# Patient Record
Sex: MALE | Race: WHITE | NOT HISPANIC OR LATINO | Employment: OTHER | URBAN - NONMETROPOLITAN AREA
[De-identification: names, ages, dates, MRNs, and addresses within clinical notes are randomized per-mention and may not be internally consistent; named-entity substitution may affect disease eponyms.]

---

## 2020-11-29 ENCOUNTER — HOSPITAL ENCOUNTER (EMERGENCY)
Facility: HOSPITAL | Age: 75
Discharge: HOME | End: 2020-11-29
Attending: FAMILY MEDICINE | Admitting: FAMILY MEDICINE

## 2020-11-29 ENCOUNTER — APPOINTMENT (OUTPATIENT)
Dept: GENERAL RADIOLOGY | Facility: HOSPITAL | Age: 75
End: 2020-11-29

## 2020-11-29 VITALS
HEART RATE: 66 BPM | SYSTOLIC BLOOD PRESSURE: 135 MMHG | RESPIRATION RATE: 20 BRPM | HEIGHT: 69 IN | OXYGEN SATURATION: 98 % | DIASTOLIC BLOOD PRESSURE: 76 MMHG | WEIGHT: 240 LBS | TEMPERATURE: 97.9 F | BODY MASS INDEX: 35.55 KG/M2

## 2020-11-29 DIAGNOSIS — S43.005A DISLOCATION OF LEFT SHOULDER JOINT, INITIAL ENCOUNTER: Primary | ICD-10-CM

## 2020-11-29 PROCEDURE — 99284 EMERGENCY DEPT VISIT MOD MDM: CPT

## 2020-11-29 PROCEDURE — 73030 X-RAY EXAM OF SHOULDER: CPT

## 2020-11-29 PROCEDURE — 73020 X-RAY EXAM OF SHOULDER: CPT

## 2020-11-29 RX ORDER — AMLODIPINE BESYLATE 5 MG/1
5 TABLET ORAL DAILY
COMMUNITY
Start: 2020-11-02 | End: 2021-05-02

## 2020-11-29 RX ORDER — ONDANSETRON 4 MG/1
4 TABLET, ORALLY DISINTEGRATING ORAL EVERY 6 HOURS PRN
Qty: 10 TABLET | Refills: 0 | Status: SHIPPED | OUTPATIENT
Start: 2020-11-29

## 2020-11-29 RX ORDER — TAMSULOSIN HYDROCHLORIDE 0.4 MG/1
1 CAPSULE ORAL DAILY
COMMUNITY

## 2020-11-29 RX ORDER — KETAMINE HYDROCHLORIDE 100 MG/ML
1 INJECTION INTRAMUSCULAR; INTRAVENOUS ONCE
Status: COMPLETED | OUTPATIENT
Start: 2020-11-29 | End: 2020-11-29

## 2020-11-29 RX ORDER — NITROGLYCERIN 0.4 MG/1
0.4 TABLET SUBLINGUAL
COMMUNITY

## 2020-11-29 RX ORDER — MULTIPLE VITAMINS W/ MINERALS TAB 9MG-400MCG
1 TAB ORAL DAILY
COMMUNITY

## 2020-11-29 RX ORDER — HYDROCODONE BITARTRATE AND ACETAMINOPHEN 7.5; 325 MG/1; MG/1
1 TABLET ORAL EVERY 6 HOURS PRN
Qty: 12 TABLET | Refills: 0 | Status: SHIPPED | OUTPATIENT
Start: 2020-11-29 | End: 2020-12-01

## 2020-11-29 RX ORDER — ACETAMINOPHEN AND CODEINE PHOSPHATE 300; 30 MG/1; MG/1
1 TABLET ORAL EVERY 4 HOURS PRN
COMMUNITY

## 2020-11-29 RX ORDER — ASPIRIN 81 MG/1
81 TABLET, CHEWABLE ORAL DAILY
COMMUNITY

## 2020-11-29 RX ADMIN — KETAMINE HYDROCHLORIDE 109 MG: 100 INJECTION INTRAMUSCULAR; INTRAVENOUS at 14:46

## 2020-12-01 ENCOUNTER — OFFICE VISIT (OUTPATIENT)
Dept: ORTHOPEDIC SURGERY | Facility: CLINIC | Age: 75
End: 2020-12-01

## 2020-12-01 VITALS
HEIGHT: 69 IN | BODY MASS INDEX: 34.18 KG/M2 | OXYGEN SATURATION: 98 % | WEIGHT: 230.8 LBS | DIASTOLIC BLOOD PRESSURE: 79 MMHG | SYSTOLIC BLOOD PRESSURE: 150 MMHG | TEMPERATURE: 98 F | HEART RATE: 84 BPM

## 2020-12-01 DIAGNOSIS — S43.004S SHOULDER DISLOCATION, RIGHT, SEQUELA: ICD-10-CM

## 2020-12-01 DIAGNOSIS — M25.512 ACUTE PAIN OF LEFT SHOULDER: Primary | ICD-10-CM

## 2020-12-01 PROCEDURE — 99202 OFFICE O/P NEW SF 15 MIN: CPT | Performed by: ORTHOPAEDIC SURGERY

## 2020-12-01 RX ORDER — HYDROCODONE BITARTRATE AND ACETAMINOPHEN 7.5; 325 MG/1; MG/1
TABLET ORAL
Qty: 20 TABLET | Refills: 0 | Status: SHIPPED | OUTPATIENT
Start: 2020-12-01 | End: 2020-12-15

## 2020-12-01 NOTE — PROGRESS NOTES
Akash Crum is a 75 y.o. male   Primary provider:  Katlyn Adhikari DO       Chief Complaint   Patient presents with   • Left Shoulder - Pain   • Establish Care       HISTORY OF PRESENT ILLNESS: Patient being seen for left shoulder pain due to injury occurring 11/29/2020. X-rays done at .     This is the first office visit for evaluation of left shoulder pain.    Mr. Crum is 75 years old and right-hand dominant.  He was loading a trailer on 29 November when he fell with an injury to the left shoulder.  The exact mechanism of injury is uncertain.  He was seen in the emergency room and x-rays showed a dislocation of the shoulder.  He underwent closed manipulation.  He is having the expected amount of pain.  Denies any numbness or tingling in the limb.  Past history is remarkable for previous problems with the left shoulder requiring injection in the remote past.  It sounds like he likely had rotator cuff tendinitis.  He says those symptoms significantly improved after he retired.    Home medications include Tylenol No. 3 Norvasc aspirin Metformin Zofran and hydrocodone.  He reportedly takes the Tylenol 3 on a chronic basis and was prescribed hydrocodone in the emergency room.  He is allergic to latex niacin and statins.  Past medical history is remarkable for diabetes and coronary artery disease.  He is retired in the coal mines and .      Pain  This is a new problem. The current episode started in the past 7 days. Associated symptoms comments: Aching, bruising, swelling. He has tried ice for the symptoms.        CONCURRENT MEDICAL HISTORY:    Past Medical History:   Diagnosis Date   • Cardiac disease    • Diabetes (CMS/HCC)        Allergies   Allergen Reactions   • Statins Myalgia   • Colesevelam Myalgia     Diarrhea Welchol 6 tabs   • Evolocumab Myalgia     Muscle cramps   • Ezetimibe Myalgia     Muscle Aches   • Latex Rash     Unsure if truly allergic. Had skin peeling with bandaids. Did not occur  with Latex-free bandaids.     • Niacin Rash     Excessive flushing         Current Outpatient Medications:   •  acetaminophen-codeine (TYLENOL #3) 300-30 MG per tablet, Take 1 tablet by mouth Every 4 (Four) Hours As Needed for Moderate Pain ., Disp: , Rfl:   •  amLODIPine (NORVASC) 5 MG tablet, Take 5 mg by mouth Daily., Disp: , Rfl:   •  aspirin 81 MG chewable tablet, Chew 81 mg Daily., Disp: , Rfl:   •  Cholecalciferol (vitamin D3) 125 MCG (5000 UT) capsule capsule, Take 5,000 Units by mouth Daily., Disp: , Rfl:   •  metFORMIN (GLUCOPHAGE) 500 MG tablet, Take 500 mg by mouth., Disp: , Rfl:   •  multivitamin with minerals (CENTRUM ADULTS PO), Take 1 tablet by mouth Daily., Disp: , Rfl:   •  nitroglycerin (NITROSTAT) 0.4 MG SL tablet, Place 0.4 mg under the tongue., Disp: , Rfl:   •  ondansetron ODT (ZOFRAN-ODT) 4 MG disintegrating tablet, Place 1 tablet on the tongue Every 6 (Six) Hours As Needed for Nausea or Vomiting., Disp: 10 tablet, Rfl: 0  •  tamsulosin (FLOMAX) 0.4 MG capsule 24 hr capsule, Take 1 capsule by mouth Daily., Disp: , Rfl:   •  HYDROcodone-acetaminophen (NORCO) 7.5-325 MG per tablet, 1 by mouth 3 times a day PRN severe pain, Disp: 20 tablet, Rfl: 0    Past Surgical History:   Procedure Laterality Date   • BACK SURGERY      lumbar   • HIP SURGERY  2016   • OTHER SURGICAL HISTORY      2 cardiac stents       Family History   Problem Relation Age of Onset   • Heart disease Other    • Hypertension Other    • Diabetes Other         Social History     Socioeconomic History   • Marital status:      Spouse name: Not on file   • Number of children: Not on file   • Years of education: Not on file   • Highest education level: Not on file   Tobacco Use   • Smoking status: Never Smoker   • Smokeless tobacco: Never Used   Substance and Sexual Activity   • Alcohol use: Never     Frequency: Never   • Drug use: Never      ReView of systems is positive as noted above.  Review of Systems   Constitutional:  "Negative.    HENT: Negative.    Eyes: Negative.    Respiratory: Negative.    Cardiovascular: Negative.    Gastrointestinal: Negative.    Endocrine: Negative.    Genitourinary: Negative.    Musculoskeletal: Negative.    Skin: Negative.    Allergic/Immunologic: Negative.    Neurological: Negative.    Hematological: Negative.    Psychiatric/Behavioral: Negative.    Review of systems is positive as noted above.    PHYSICAL EXAMINATION:       Vitals:    12/01/20 1356   BP: 150/79   BP Location: Right arm   Patient Position: Sitting   Pulse: 84   Temp: 98 °F (36.7 °C)   SpO2: 98%   Weight: 105 kg (230 lb 12.8 oz)   Height: 175.3 cm (69\")   PainSc:   4       Physical Exam he is alert pleasant and in apparent mild discomfort at rest.  He responds appropriately to questions and commands.  GAIT:     []  Normal  [x]  Antalgic    Assistive device: [x]  None  []  Walker     []  Crutches  []  Cane     []  Wheelchair  []  Stretcher    Ortho Exam is directed to the left upper extremity.  There was moderate ecchymosis over the lateral aspect of the upper pectoral region and anterior deltoid.  There is no palpable hematoma.  Gentle rotation of the shoulder produces no crepitus and mild pain.  Sensory exam is intact to soft touch.  Radial ulnar median and musculocutaneous motor function are all intact.  It was difficult to assess strength about the shoulder because of the moderate pain.  There was moderate weakness of external rotation.    Radiographs of the shoulder dated 29th November reviewed.  There is an anterior dislocation of the shoulder in a subcoracoid location.  There was moderate degenerative change of the acromioclavicular joint as well as a moderate spur of the anterior acromion.  Radiographs done after Manipulation confirm what appears to be reduction of the joint.  There is a suggestion of a moderate Hill-Sachs deformity.  The acromiohumeral space was normal.            Xr Shoulder 1 View Left    Result Date: " 11/29/2020  Narrative: Single view left shoulder HISTORY: Post reduction AP view obtained. COMPARISON: 1:00 PM FINDINGS: There has been been reduction of the anterior dislocation. Hill-Sachs deformity. Hypertrophic change acromioclavicular joint. Degenerative changes in the thoracic spine. Minimal subsegmental atelectasis or infiltrate left lung base. No other osseous or articular abnormality.     Impression: CONCLUSION: There has been been reduction of the anterior dislocation. Hill-Sachs deformity. Hypertrophic change acromioclavicular joint. Minimal subsegmental atelectasis or infiltrate left lung base. [id number] Electronically signed by:  Toney Alexander MD  11/29/2020 2:59 PM CST Workstation: [id number]    Xr Shoulder 2+ View Left    Result Date: 11/29/2020  Narrative: PROCEDURE: XR SHOULDER 2 OR MORE VIEWS Clinical History: fall, pain Indication: Same as above Comparison: None . Technique: Three views of the left shoulder were done. Findings: There is anterior subcoracoid dislocation of the proximal left humerus. The left AC joint is intact and the left-sided ribs are intact The visualized lung fields are radiographically unremarkable. The adjacent soft tissues are radiographically unremarkable. There is no visualization of any radiopaque foreign bodies in the soft tissues.     Impression: Impression: There is anterior subcoracoid dislocation of the proximal left humerus. Electronically signed by:  Tony Colvin MD  11/29/2020 1:07 PM CST Workstation: [id number]          ASSESSMENT: ACute primary anterior dislocation left shoulder.  He has had appropriate treatment so far.  Because of his history of previous shoulder problems we will need to monitor him closely for possible rotator cuff tear.    He was encouraged in range of motion exercises for the elbow wrist and fingers and may begin some gentle pendulum exercises as his pain permits.  He was instructed in activity restriction.    He requested a refill of his  hydrocodone.  I interrogated Xavier and I felt it was appropriate to prescribe him this medication.  He can use the hydrocodone for the next week or so but then should be able to return to his chronic narcotic regimen.    Return here in 2 weeks for clinical exam.    Diagnoses and all orders for this visit:    Acute pain of left shoulder  -     HYDROcodone-acetaminophen (NORCO) 7.5-325 MG per tablet; 1 by mouth 3 times a day PRN severe pain    Shoulder dislocation, right, sequela          PLAN    Patient's Body mass index is 34.08 kg/m². BMI is above normal parameters. Recommendations include: exercise counseling, nutrition counseling and referral to primary care.      Return in about 2 weeks (around 12/15/2020).    Bj Bradley MD

## 2020-12-15 ENCOUNTER — OFFICE VISIT (OUTPATIENT)
Dept: ORTHOPEDIC SURGERY | Facility: CLINIC | Age: 75
End: 2020-12-15

## 2020-12-15 VITALS — HEIGHT: 69 IN | OXYGEN SATURATION: 98 % | BODY MASS INDEX: 32.75 KG/M2 | WEIGHT: 221.1 LBS | HEART RATE: 71 BPM

## 2020-12-15 DIAGNOSIS — S46.012D TRAUMATIC TEAR OF LEFT ROTATOR CUFF, UNSPECIFIED TEAR EXTENT, SUBSEQUENT ENCOUNTER: ICD-10-CM

## 2020-12-15 DIAGNOSIS — S43.005S SHOULDER DISLOCATION, LEFT, SEQUELA: ICD-10-CM

## 2020-12-15 DIAGNOSIS — M25.512 ACUTE PAIN OF LEFT SHOULDER: Primary | ICD-10-CM

## 2020-12-15 PROCEDURE — 99213 OFFICE O/P EST LOW 20 MIN: CPT | Performed by: ORTHOPAEDIC SURGERY

## 2020-12-15 NOTE — PROGRESS NOTES
"Akash Crum is a 75 y.o. male returns for     Chief Complaint   Patient presents with   • Left Shoulder - Follow-up       HISTORY OF PRESENT ILLNESS: Patient being seen for left shoulder follow up. Date of injury 11/29/2020.     Mr. Crum had his injury about 2 weeks ago.  He is having little pain.       CONCURRENT MEDICAL HISTORY:    The following portions of the patient's history were reviewed and updated as appropriate: allergies, current medications, past family history, past medical history, past social history, past surgical history and problem list.         PHYSICAL EXAMINATION:       Pulse 71   Ht 175.3 cm (69\")   Wt 100 kg (221 lb 1.6 oz)   SpO2 98%   BMI 32.65 kg/m²     Physical Exam he is alert and in no apparent distress.  GAIT:     [x]  Normal  []  Antalgic    Assistive device: [x]  None  []  Walker     []  Crutches  []  Cane     []  Wheelchair  []  Stretcher    Ortho Exam active motion of the left shoulder is markedly decreased in all planes with active elevation no more than 25 to 30 degrees.  The limb rests in internal rotation.  There is prominent weakness of external rotation and abduction of the shoulder and mild weakness of internal rotation with mild complaints of pain.  Sensory exam is intact to soft touch.  Passive elevation of the shoulder is smooth to about 100 degrees.      Xr Shoulder 1 View Left    Result Date: 11/29/2020  Narrative: Single view left shoulder HISTORY: Post reduction AP view obtained. COMPARISON: 1:00 PM FINDINGS: There has been been reduction of the anterior dislocation. Hill-Sachs deformity. Hypertrophic change acromioclavicular joint. Degenerative changes in the thoracic spine. Minimal subsegmental atelectasis or infiltrate left lung base. No other osseous or articular abnormality.     Impression: CONCLUSION: There has been been reduction of the anterior dislocation. Hill-Sachs deformity. Hypertrophic change acromioclavicular joint. Minimal subsegmental " atelectasis or infiltrate left lung base. [id number] Electronically signed by:  Toney Alexander MD  11/29/2020 2:59 PM CST Workstation: [id number]    Xr Shoulder 2+ View Left    Result Date: 11/29/2020  Narrative: PROCEDURE: XR SHOULDER 2 OR MORE VIEWS Clinical History: fall, pain Indication: Same as above Comparison: None . Technique: Three views of the left shoulder were done. Findings: There is anterior subcoracoid dislocation of the proximal left humerus. The left AC joint is intact and the left-sided ribs are intact The visualized lung fields are radiographically unremarkable. The adjacent soft tissues are radiographically unremarkable. There is no visualization of any radiopaque foreign bodies in the soft tissues.     Impression: Impression: There is anterior subcoracoid dislocation of the proximal left humerus. Electronically signed by:  Tony Colvin MD  11/29/2020 1:07 PM CST Workstation: [id number]            ASSESSMENT: 1 anterior dislocation left shoulder.  2 probable rotator cuff tear secondary to #1.    The natural history of the disorder and treatment options were reviewed.  He was given the [employer] website to further educate himself.  He understands surgical repair of the cuff may be needed.  I recommend obtaining an MRI scan.  He was agreeable with this.    He was instructed in passive motion of the shoulder.    Return here after the study for further counseling.    Diagnoses and all orders for this visit:    Acute pain of left shoulder  -     MRI Shoulder Left Without Contrast; Future    Shoulder dislocation, left, sequela    Traumatic tear of left rotator cuff, unspecified tear extent, subsequent encounter          PLAN    Patient's Body mass index is 32.65 kg/m². BMI is above normal parameters. Recommendations include: referral to primary care.      Return if symptoms worsen or fail to improve.    Bj Bradley MD

## 2020-12-29 ENCOUNTER — HOSPITAL ENCOUNTER (OUTPATIENT)
Dept: MRI IMAGING | Facility: HOSPITAL | Age: 75
Discharge: HOME | End: 2020-12-29
Admitting: ORTHOPAEDIC SURGERY

## 2020-12-29 DIAGNOSIS — M25.512 ACUTE PAIN OF LEFT SHOULDER: ICD-10-CM

## 2020-12-29 PROCEDURE — 73221 MRI JOINT UPR EXTREM W/O DYE: CPT

## 2021-01-06 ENCOUNTER — OFFICE VISIT (OUTPATIENT)
Dept: ORTHOPEDIC SURGERY | Facility: CLINIC | Age: 76
End: 2021-01-06

## 2021-01-06 VITALS
BODY MASS INDEX: 37.77 KG/M2 | TEMPERATURE: 98.6 F | DIASTOLIC BLOOD PRESSURE: 84 MMHG | SYSTOLIC BLOOD PRESSURE: 158 MMHG | OXYGEN SATURATION: 96 % | WEIGHT: 255 LBS | HEART RATE: 78 BPM | HEIGHT: 69 IN

## 2021-01-06 DIAGNOSIS — M75.122 COMPLETE TEAR OF LEFT ROTATOR CUFF, UNSPECIFIED WHETHER TRAUMATIC: ICD-10-CM

## 2021-01-06 DIAGNOSIS — M25.512 ACUTE PAIN OF LEFT SHOULDER: Primary | ICD-10-CM

## 2021-01-06 DIAGNOSIS — S43.005S SHOULDER DISLOCATION, LEFT, SEQUELA: ICD-10-CM

## 2021-01-06 PROCEDURE — 20610 DRAIN/INJ JOINT/BURSA W/O US: CPT | Performed by: ORTHOPAEDIC SURGERY

## 2021-01-06 PROCEDURE — 99214 OFFICE O/P EST MOD 30 MIN: CPT | Performed by: ORTHOPAEDIC SURGERY

## 2021-01-06 RX ORDER — BUPIVACAINE HYDROCHLORIDE 5 MG/ML
3 INJECTION, SOLUTION PERINEURAL
Status: COMPLETED | OUTPATIENT
Start: 2021-01-06 | End: 2021-01-06

## 2021-01-06 RX ORDER — TRIAMCINOLONE ACETONIDE 40 MG/ML
80 INJECTION, SUSPENSION INTRA-ARTICULAR; INTRAMUSCULAR
Status: COMPLETED | OUTPATIENT
Start: 2021-01-06 | End: 2021-01-06

## 2021-01-06 RX ORDER — LIDOCAINE HYDROCHLORIDE AND EPINEPHRINE 10; 10 MG/ML; UG/ML
2 INJECTION, SOLUTION INFILTRATION; PERINEURAL
Status: COMPLETED | OUTPATIENT
Start: 2021-01-06 | End: 2021-01-06

## 2021-01-06 RX ADMIN — LIDOCAINE HYDROCHLORIDE AND EPINEPHRINE 2 ML: 10; 10 INJECTION, SOLUTION INFILTRATION; PERINEURAL at 16:04

## 2021-01-06 RX ADMIN — TRIAMCINOLONE ACETONIDE 80 MG: 40 INJECTION, SUSPENSION INTRA-ARTICULAR; INTRAMUSCULAR at 16:04

## 2021-01-06 RX ADMIN — BUPIVACAINE HYDROCHLORIDE 3 ML: 5 INJECTION, SOLUTION PERINEURAL at 16:04

## 2021-01-06 NOTE — PROGRESS NOTES
"Akash Crum is a 75 y.o. male returns for     Chief Complaint   Patient presents with   • Left Shoulder - Follow-up     MRI results          HISTORY OF PRESENT ILLNESS: Patient presents here today for MRI results on his left shoulder.     Mr. Crum had his dislocation almost 6 weeks ago.  His pain has improved but he has continued weakness of the shoulder.       CONCURRENT MEDICAL HISTORY:    The following portions of the patient's history were reviewed and updated as appropriate: allergies, current medications, past family history, past medical history, past social history, past surgical history and problem list.     ROS  No fevers or chills.  No chest pain or shortness of air.  No GI or  disturbances.    PHYSICAL EXAMINATION:       /84   Pulse 78   Temp 98.6 °F (37 °C)   Ht 175.3 cm (69\")   Wt 116 kg (255 lb)   SpO2 96%   BMI 37.66 kg/m²     Physical Exam he is alert and in no apparent distress at rest.    GAIT:     []  Normal  []  Antalgic    Assistive device: []  None  []  Walker     []  Crutches  []  Cane     []  Wheelchair  []  Stretcher    Ortho Exam active elevation of the shoulder is limited to about 30 degrees with mild complaints of pain.  Active external rotation is to about 20 degrees of internal rotation.  There is marked weakness of abduction and external rotation of the shoulder.  Internal rotation strength appears fairly normal.    MRI scan of the shoulder done recently was reviewed.  There is full-thickness tear of the infraspinatus and supraspinatus with prominent retraction.  There is also prominent atrophy of the posterior cuff and supraspinatus muscles.  There is also acromioclavicular arthritis.      Mri Shoulder Left Without Contrast    Result Date: 12/29/2020  Narrative: MRI left shoulder HISTORY: Left shoulder pain. Prior exam: left shoulder November 29, 2020. technique: Multiplanar multisequence noncontrast images left shoulder. FINDINGS: Large full-thickness rotator " cuff tears involving the entire supraspinatous and infraspinatus tendons. The size of the catheter and lateral to medial projection 5 cm. The size of the gap in the anterior posterior direction 4.6 cm. There is muscular tendinous retraction. There is moderate severity supraspinatous and infraspinous muscle atrophy. There is upward migration of the humeral head respect to the glenoid often seen in long-standing high-grade rotator cuff tears. There is acromioclavicular joint arthrosis. Fluid is noted within the subacromial, subdeltoid bursa, bursitis. There is marked thinning of the biceps tendon within the intertubercular sulcus. High-grade partial-thickness tear. There is also thickening and irregularity of the distal aspect subscapularis tendon, high-grade partial-thickness tear. Increased signal intensity, bone edema superior lateral aspect of the humeral head most likely reactive stress changes, (in contact with the acromion) .     Impression: Large full-thickness complete rotator cuff tears involving the entire supraspinatous and infraspinatus tendons with muscular tendinous retraction  and supraspinatous and infraspinatus muscle atrophy. Thinning of the biceps tendon within the intertubercular sulcus i.e. high-grade partial-thickness tear. Thickening and irregularity distal subscapularis tendon i.e. high-grade partial-thickness interstitial tear. Increased signal intensity, bone edema superior lateral aspect humeral head most likely reactive stress changes. Electronically signed by:  Yosef Ahumada MD  12/29/2020 4:43 PM CST Workstation: [id number]            ASSESSMENT: 1 massive rotator cuff tear.  2 dislocation left shoulder.    I suspect he has an acute on chronic tear.    I had a prolonged discussion with the patient and his wife concerning the natural history of the disorder and treatment options.  He understands there is no treatment that would predictably result in normal function of the shoulder.  They  understand that arthroplasty of the shoulder may eventually be needed.  I recommended continued symptomatic treatment.  He was encouraged in his home exercise.    Potential benefits of injection therapy were discussed.  He wished to proceed with this.    The left shoulder was prepped.  Skin was infiltrated with 1% Xylocaine with epinephrine.  The glenohumeral joint was then injected through a posterior puncture using a mixture of Marcaine Kenalog and Xylocaine with epinephrine.  There were no complications.    Return here in 1 month for clinical exam.    Diagnoses and all orders for this visit:    Acute pain of left shoulder  -     Large Joint Arthrocentesis: L subacromial bursa    Shoulder dislocation, left, sequela  -     Large Joint Arthrocentesis: L subacromial bursa    Complete tear of left rotator cuff, unspecified whether traumatic          PLAN    Large Joint Arthrocentesis: L subacromial bursa  Date/Time: 1/6/2021 4:04 PM  Consent given by: patient  Site marked: site marked  Timeout: Immediately prior to procedure a time out was called to verify the correct patient, procedure, equipment, support staff and site/side marked as required   Supporting Documentation  Indications: pain   Procedure Details  Location: shoulder - L subacromial bursa  Preparation: Patient was prepped and draped in the usual sterile fashion  Needle size: 22 G  Approach: posterior  Medications administered: 2 mL lidocaine-EPINEPHrine 1 %-1:571705; 3 mL bupivacaine 0.5 %; 80 mg triamcinolone acetonide 40 MG/ML  Patient tolerance: patient tolerated the procedure well with no immediate complications            Patient's Body mass index is 37.66 kg/m². BMI is above normal parameters. Recommendations include: exercise counseling, nutrition counseling and referral to primary care.      No follow-ups on file.    Bj Bradley MD

## 2021-03-15 ENCOUNTER — BULK ORDERING (OUTPATIENT)
Dept: CASE MANAGEMENT | Facility: OTHER | Age: 76
End: 2021-03-15

## 2021-03-15 DIAGNOSIS — Z23 IMMUNIZATION DUE: ICD-10-CM

## 2023-02-21 ENCOUNTER — HOSPITAL ENCOUNTER (EMERGENCY)
Facility: HOSPITAL | Age: 78
Discharge: HOME | End: 2023-02-21
Attending: FAMILY MEDICINE | Admitting: FAMILY MEDICINE
Payer: MEDICARE

## 2023-02-21 VITALS
BODY MASS INDEX: 34.07 KG/M2 | HEART RATE: 66 BPM | RESPIRATION RATE: 16 BRPM | OXYGEN SATURATION: 93 % | DIASTOLIC BLOOD PRESSURE: 76 MMHG | WEIGHT: 230 LBS | HEIGHT: 69 IN | SYSTOLIC BLOOD PRESSURE: 160 MMHG

## 2023-02-21 DIAGNOSIS — R04.0 EPISTAXIS: Primary | ICD-10-CM

## 2023-02-21 PROCEDURE — 99283 EMERGENCY DEPT VISIT LOW MDM: CPT

## 2023-02-21 RX ORDER — CLONIDINE HYDROCHLORIDE 0.1 MG/1
0.1 TABLET ORAL AS NEEDED
Qty: 12 TABLET | Refills: 0 | Status: SHIPPED | OUTPATIENT
Start: 2023-02-21

## 2023-02-21 RX ORDER — LISINOPRIL 20 MG/1
20 TABLET ORAL DAILY
COMMUNITY

## 2023-02-21 RX ORDER — AMOXICILLIN 500 MG/1
500 CAPSULE ORAL 2 TIMES DAILY
Qty: 12 CAPSULE | Refills: 0 | Status: SHIPPED | OUTPATIENT
Start: 2023-02-21

## 2023-02-21 RX ORDER — AMLODIPINE BESYLATE 5 MG/1
5 TABLET ORAL DAILY
COMMUNITY
Start: 2023-02-21

## 2023-02-21 RX ORDER — LOSARTAN POTASSIUM AND HYDROCHLOROTHIAZIDE 12.5; 5 MG/1; MG/1
1 TABLET ORAL DAILY
COMMUNITY
Start: 2023-01-24

## 2023-02-21 RX ORDER — AMOXICILLIN 500 MG/1
500 CAPSULE ORAL ONCE
Status: COMPLETED | OUTPATIENT
Start: 2023-02-21 | End: 2023-02-21

## 2023-02-21 RX ADMIN — AMOXICILLIN 500 MG: 500 CAPSULE ORAL at 21:27

## 2023-02-22 NOTE — DISCHARGE INSTRUCTIONS
Please keep nose packed and let the nasal cavity heal. Please take antibiotics course as prescribed. Take clonidine as needed if blood pressure is above 160 systolic (top number). Follow up with ENT for further evaluation and management.

## 2023-02-22 NOTE — ED PROVIDER NOTES
Subjective   History of Present Illness  76 y/o male here for epistaxis. Symptoms presented a few days ago where he went to the ED in [place]. He was given a cotton ball with afrin and then followed up with ENT. There they used chemical cauterization to treat the bleed. Today they went to the ENT again for recurrent epistaxis, chemical cauterization was performed again and bleed was stopped prior to the patient going home. When arrived home his nose started to bleed again. They put afrin on cotton balls about 1-2 hours ago and the the bleed has been stable. Per chart review there is mention of his cholesterol medication being the cause of her increase blood loss.          Review of Systems   Constitutional: Negative.    HENT: Positive for postnasal drip and sinus pressure. Negative for facial swelling, tinnitus and trouble swallowing.    Eyes: Negative.    Respiratory: Negative.    Cardiovascular: Negative.    Gastrointestinal: Negative.    Endocrine: Negative.    Genitourinary: Negative.    Musculoskeletal: Negative.    Skin: Negative.    Allergic/Immunologic: Negative.    Neurological: Negative.    Hematological: Negative.    Psychiatric/Behavioral: Negative.        Past Medical History:   Diagnosis Date   • Cardiac disease    • Diabetes (HCC)        Allergies   Allergen Reactions   • Statins Myalgia   • Colesevelam Myalgia     Diarrhea Welchol 6 tabs   • Evolocumab Myalgia     Muscle cramps   • Ezetimibe Myalgia     Muscle Aches   • Latex Rash     Unsure if truly allergic. Had skin peeling with bandaids. Did not occur with Latex-free bandaids.     • Niacin Rash     Excessive flushing       Past Surgical History:   Procedure Laterality Date   • BACK SURGERY      lumbar   • HIP SURGERY  2016   • OTHER SURGICAL HISTORY      2 cardiac stents       Family History   Problem Relation Age of Onset   • Heart disease Other    • Hypertension Other    • Diabetes Other        Social History     Socioeconomic History   •  Marital status:    Tobacco Use   • Smoking status: Never   • Smokeless tobacco: Never   Substance and Sexual Activity   • Alcohol use: Never   • Drug use: Never           Objective   Physical Exam  Constitutional:       Appearance: Normal appearance.   HENT:      Nose: Nose normal.      Mouth/Throat:      Mouth: Mucous membranes are moist.   Eyes:      Extraocular Movements: Extraocular movements intact.      Conjunctiva/sclera: Conjunctivae normal.      Pupils: Pupils are equal, round, and reactive to light.   Cardiovascular:      Rate and Rhythm: Normal rate and regular rhythm.      Pulses: Normal pulses.      Heart sounds: Normal heart sounds.   Pulmonary:      Effort: Pulmonary effort is normal.      Breath sounds: Normal breath sounds.   Musculoskeletal:         General: Normal range of motion.      Cervical back: Normal range of motion.   Skin:     General: Skin is warm.   Neurological:      General: No focal deficit present.      Mental Status: He is alert.   Psychiatric:         Mood and Affect: Mood normal.         Behavior: Behavior normal.       No results found.    Procedures           ED Course    No results found for this or any previous visit.                                         Medical Decision Making  77-year-old patient here for epistaxis.  Educated patient about leaving his causing with Afrin.  Discussed the benefits of allowing the area to clot to prevent rebleeds.  Advised him to change his current cottonball tomorrow and if bleeding continues use Afrin again.  Discussed timeframe for healing and possible causes such as some of his medications.  Discharged him with amoxicillin to prevent any possible infections.  He was given 1 dose prior to discharge.  Also gave him clonidine to be used as needed for blood pressures over 160.  Recommended he follow-up with his ENT.  Discussed return precautions, patient understood and agreed.    Epistaxis: acute illness or injury  Risk  Prescription  drug management.          Final diagnoses:   Epistaxis       ED Disposition  ED Disposition     ED Disposition   Discharge    Condition   Stable    Comment   --             February 21, 2023 21:44 CST  Katlyn Adhikari, DO  [address]  [facility] [address]  [facility] [address] [phone]    Schedule an appointment as soon as possible for a visit       Olimpia June MD  [address] [place] [address] [address]  [facility] [address] [phone]      As needed         Medication List      New Prescriptions    amoxicillin 500 MG capsule  Commonly known as: AMOXIL  Take 1 capsule by mouth 2 (Two) Times a Day.     cloNIDine 0.1 MG tablet  Commonly known as: CATAPRES  Take 1 tablet by mouth As Needed for High Blood Pressure.           Where to Get Your Medications      These medications were sent to [facility] [address] [place] [address] [address] - [phone]  - [phone] [facility] [address] [facility] [address]    Phone: [phone]   · amoxicillin 500 MG capsule  · cloNIDine 0.1 MG tablet       This document has been electronically signed by Yanet Baird MD on February 21, 2023 21:44 Yanet Amanda MD  Resident  02/21/23 [id number]